# Patient Record
Sex: FEMALE
[De-identification: names, ages, dates, MRNs, and addresses within clinical notes are randomized per-mention and may not be internally consistent; named-entity substitution may affect disease eponyms.]

---

## 2023-09-28 ENCOUNTER — APPOINTMENT (OUTPATIENT)
Dept: PULMONOLOGY | Facility: CLINIC | Age: 38
End: 2023-09-28
Payer: MEDICAID

## 2023-09-28 VITALS
WEIGHT: 190 LBS | DIASTOLIC BLOOD PRESSURE: 72 MMHG | SYSTOLIC BLOOD PRESSURE: 120 MMHG | OXYGEN SATURATION: 98 % | BODY MASS INDEX: 33.66 KG/M2 | HEIGHT: 63 IN | HEART RATE: 102 BPM | TEMPERATURE: 97.3 F

## 2023-09-28 DIAGNOSIS — Z78.9 OTHER SPECIFIED HEALTH STATUS: ICD-10-CM

## 2023-09-28 DIAGNOSIS — R05.9 COUGH, UNSPECIFIED: ICD-10-CM

## 2023-09-28 PROBLEM — Z00.00 ENCOUNTER FOR PREVENTIVE HEALTH EXAMINATION: Status: ACTIVE | Noted: 2023-09-28

## 2023-09-28 PROCEDURE — 94727 GAS DIL/WSHOT DETER LNG VOL: CPT

## 2023-09-28 PROCEDURE — 99203 OFFICE O/P NEW LOW 30 MIN: CPT | Mod: 25

## 2023-09-28 PROCEDURE — 94729 DIFFUSING CAPACITY: CPT

## 2023-09-28 PROCEDURE — 94060 EVALUATION OF WHEEZING: CPT

## 2023-09-28 RX ORDER — ALBUTEROL SULFATE 90 UG/1
108 (90 BASE) INHALANT RESPIRATORY (INHALATION)
Qty: 1 | Refills: 3 | Status: ACTIVE | COMMUNITY
Start: 2023-09-28 | End: 1900-01-01

## 2023-09-28 RX ORDER — PREDNISONE 20 MG/1
20 TABLET ORAL
Qty: 10 | Refills: 0 | Status: ACTIVE | COMMUNITY
Start: 2023-09-28 | End: 1900-01-01

## 2023-12-17 NOTE — DISCUSSION/SUMMARY
[FreeTextEntry1] : 38-year-old female with complaints related to airways hyperreactivity.  I reviewed the PFT results with the patient.  Prednisone 40 mg daily for 5 days was prescribed with albuterol to be used as needed.  Treatment adjustment will depend on symptomatic needs.  She is to follow-up with her PMD as before.

## 2023-12-17 NOTE — HISTORY OF PRESENT ILLNESS
[TextBox_4] : 38-year-old female presents for evaluation of 3-week history of productive cough associated with "itch" in her throat.  Patient denies fever, chills, chest pain or hemoptysis.  She had similar problem 6 months ago which was associated with shortness of breath ,at which time an inhaler was prescribed but she did not use.  She has never smoked and denies GERD like symptoms. [TextBox_29] : Denies snoring, daytime somnolence, apneic episodes, AM headaches